# Patient Record
Sex: MALE | Race: WHITE | NOT HISPANIC OR LATINO
[De-identification: names, ages, dates, MRNs, and addresses within clinical notes are randomized per-mention and may not be internally consistent; named-entity substitution may affect disease eponyms.]

---

## 2022-08-04 ENCOUNTER — APPOINTMENT (OUTPATIENT)
Dept: ORTHOPEDIC SURGERY | Facility: CLINIC | Age: 52
End: 2022-08-04

## 2022-08-04 VITALS — BODY MASS INDEX: 29.82 KG/M2 | HEIGHT: 67 IN | WEIGHT: 190 LBS

## 2022-08-04 DIAGNOSIS — M25.519 PAIN IN UNSPECIFIED SHOULDER: ICD-10-CM

## 2022-08-04 DIAGNOSIS — Z78.9 OTHER SPECIFIED HEALTH STATUS: ICD-10-CM

## 2022-08-04 PROBLEM — Z00.00 ENCOUNTER FOR PREVENTIVE HEALTH EXAMINATION: Status: ACTIVE | Noted: 2022-08-04

## 2022-08-04 PROCEDURE — J3490M: CUSTOM

## 2022-08-04 PROCEDURE — 73010 X-RAY EXAM OF SHOULDER BLADE: CPT | Mod: LT

## 2022-08-04 PROCEDURE — 99214 OFFICE O/P EST MOD 30 MIN: CPT | Mod: 25

## 2022-08-04 PROCEDURE — 99072 ADDL SUPL MATRL&STAF TM PHE: CPT

## 2022-08-04 PROCEDURE — 20611 DRAIN/INJ JOINT/BURSA W/US: CPT | Mod: LT

## 2022-08-04 PROCEDURE — 73030 X-RAY EXAM OF SHOULDER: CPT | Mod: LT

## 2022-08-04 NOTE — ASSESSMENT
[FreeTextEntry1] : WORK INJURY WITH ENSUING MARKED PAIN AND WEAKNESS\par SMALL CALCIUM DEPOSIT ON XRAYS, LIKELY EXACERBATION WITH POSSIBLE CONCOMITANT CUFF INJURY\par CSI FOR RELIEF\par MRI TO EVAL TEAR\par NO WORK

## 2022-08-04 NOTE — HISTORY OF PRESENT ILLNESS
[9] : 9 [6] : 6 [Sharp] : sharp [Shooting] : shooting [FreeTextEntry1] : left shoulder [FreeTextEntry5] : pt is here for left shoulder injury WC doi 8/3/22. works for [employer], was working on a pole pulling a service wire laterally and felt a a pull/ "lightening bolt" go through his shoulder. pain doesn’t radiate down the arm. pain moving the arm, lifting arm, putting shirt on. 750 ibuprofen helped slightly. pt is working full time currently

## 2022-08-04 NOTE — PROCEDURE
[FreeTextEntry3] : - Large joint injection was performed of the LEFT subacromial space. \par - The indication for this procedure was pain and inflammation. Patient has tried OTC's including aspirin, Ibuprofen, Aleve, etc or prescription NSAIDS, and/or exercises at home and/or physical therapy without satisfactory response, patient had decreased mobility in the joint and the risks benefits, and alternatives have been discussed, and verbal consent was obtained. The site was prepped with alcohol, betadine, ethyl chloride sprayed topically and sterile technique used. \par - An injection of Betamethasone 2cc; Lidocaine 3cc; Marcaine 3cc injected.\par - Patient was advised to call if redness, pain or fever occur, apply ice for 15 minutes out of every hour for the next 12-24 hours as tolerated and patient was advised to rest the joint(s) for 2 days. \par - Ultrasound guidance was indicated for this patient due to prior failure or difficult injection. All ultrasound images have been permanently captured and stored accordingly in our picture archiving and communication system. Visualization of the needle and placement of injection was performed without complication.\par

## 2022-08-04 NOTE — WORK
[Total] : total [Does not reveal pre-existing condition(s) that may affect treatment/prognosis] : does not reveal pre-existing condition(s) that may affect treatment/prognosis [Cannot return to work because ________] : cannot return to work because [unfilled] [No Rx restrictions] : No Rx restrictions. [I provided the services listed above] :  I provided the services listed above.

## 2022-08-04 NOTE — IMAGING
[de-identified] : No swelling, no ecchymosis, no shilpa deformity\par Tenderness to palpation over greater tuberosity and anterior shoulder\par No instability or tenderness over AC joint\par Gurading during exam\par Marked pain with strength testing\par Positive Garcia test, positive impingement sign\par Motor and sensory intact distally\par  [Left] : left shoulder [Calcific density] : Calcific density

## 2022-08-12 ENCOUNTER — FORM ENCOUNTER (OUTPATIENT)
Age: 52
End: 2022-08-12

## 2022-08-13 ENCOUNTER — APPOINTMENT (OUTPATIENT)
Dept: MRI IMAGING | Facility: CLINIC | Age: 52
End: 2022-08-13

## 2022-08-13 PROCEDURE — 99072 ADDL SUPL MATRL&STAF TM PHE: CPT

## 2022-08-13 PROCEDURE — 73221 MRI JOINT UPR EXTREM W/O DYE: CPT | Mod: LT

## 2022-08-22 ENCOUNTER — APPOINTMENT (OUTPATIENT)
Dept: ORTHOPEDIC SURGERY | Facility: CLINIC | Age: 52
End: 2022-08-22

## 2022-08-22 PROCEDURE — 99072 ADDL SUPL MATRL&STAF TM PHE: CPT

## 2022-08-22 PROCEDURE — 99214 OFFICE O/P EST MOD 30 MIN: CPT | Mod: PA

## 2022-08-22 RX ORDER — METHYLPREDNISOLONE 4 MG/1
4 TABLET ORAL
Qty: 1 | Refills: 0 | Status: ACTIVE | COMMUNITY
Start: 2022-08-22 | End: 1900-01-01

## 2022-08-22 NOTE — WORK
[Total] : total [Does not reveal pre-existing condition(s) that may affect treatment/prognosis] : does not reveal pre-existing condition(s) that may affect treatment/prognosis [Cannot return to work because ________] : cannot return to work because [unfilled] [No Rx restrictions] : No Rx restrictions. [I provided the services listed above] :  I provided the services listed above. [Less than Sedentary Work:] :  Less than Sedentary Work: Unable to meet the requirement of Sedentary Work.

## 2022-08-22 NOTE — ASSESSMENT
[FreeTextEntry1] : WORK INJURY WITH ENSUING MARKED PAIN AND WEAKNESS\par SMALL CALCIUM DEPOSIT ON XRAYS, LIKELY EXACERBATION WITH POSSIBLE CONCOMITANT CUFF INJURY\par \par - reviewed his mri. discussed the importance of range of motion will get him into therapy and hep program. He will remain out of work at this time due to the nature of his position. will start him on medrol pack and f/u range of motion again in 2 weeks. possible return to work after that visit \par

## 2022-08-22 NOTE — HISTORY OF PRESENT ILLNESS
[9] : 9 [6] : 6 [Sharp] : sharp [Shooting] : shooting [Not working due to injury] : Work status: not working due to injury [de-identified] : 8-22-22- He states 20-4-% improvement in regards to his pain since the csi left shoulder at last visit. still with limited range of motion and weakness. He remains out of work had his mri and is for its review [FreeTextEntry1] : left shoulder [FreeTextEntry5] : pt is here for left shoulder injury WC doi 8/3/22. works for [employer], was working on a pole pulling a service wire laterally and felt a a pull/ "lightening bolt" go through his shoulder. pain doesn’t radiate down the arm. pain moving the arm, lifting arm, putting shirt on. 750 ibuprofen helped slightly. pt is working full time currently [de-identified] : mri done at [facility]  [de-identified] : nothing

## 2022-08-22 NOTE — PHYSICAL EXAM
[Left] : left shoulder [4 ___] : forward flexion 4[unfilled]/5 [4___] : internal rotation 4[unfilled]/5 [] : negative drop-arm test [FreeTextEntry9] : limited ir due to pain [TWNoteComboBox7] : active forward flexion 80 degrees [TWNoteComboBox4] : passive forward flexion 115 degrees

## 2022-08-22 NOTE — DATA REVIEWED
[MRI] : MRI [Shoulder] : shoulder [Report was reviewed and noted in the chart] : The report was reviewed and noted in the chart [I reviewed the films/CD and agree] : I reviewed the films/CD and agree [FreeTextEntry1] : 1. Multiple nonspecific findings include severe capsulitis in the glenohumeral joint along with superior labral \par tearing, rotator cuff tendinopathy, biceps tenosynovitis, AC joint arthrosis, subacromial bursitis and possible \par recent therapeutic injection.\par 2. Nonspecific areas of subcortical reactive marrow signal change in the humeral head and neck along with red \par marrow hyperplasia or muscle atrophy. The possibility of a recent anterior dislocation or subluxation episode \par cannot be excluded. Clinical correlation regarding possible reflex sympathetic or inflammatory arthropathy is \par recommended. The possibility of chronic glenohumeral joint instability should be considered.\par 3. Clinical correlation and follow up is recommended.

## 2022-08-31 ENCOUNTER — APPOINTMENT (OUTPATIENT)
Dept: ORTHOPEDIC SURGERY | Facility: CLINIC | Age: 52
End: 2022-08-31

## 2022-08-31 VITALS — BODY MASS INDEX: 29.82 KG/M2 | WEIGHT: 190 LBS | HEIGHT: 67 IN

## 2022-08-31 DIAGNOSIS — S43.422A SPRAIN OF LEFT ROTATOR CUFF CAPSULE, INITIAL ENCOUNTER: ICD-10-CM

## 2022-08-31 PROCEDURE — 99214 OFFICE O/P EST MOD 30 MIN: CPT

## 2022-08-31 PROCEDURE — 99072 ADDL SUPL MATRL&STAF TM PHE: CPT

## 2022-08-31 NOTE — IMAGING
[de-identified] : No swelling, no ecchymosis, no shilpa deformity\par Tenderness to palpation over greater tuberosity\par No instability or tenderness over AC joint\par 170/70/50/40\par +APPREHENSION\par Speed’s and yergason negative\par Motor and sensory intact distally\par

## 2022-08-31 NOTE — ASSESSMENT
[FreeTextEntry1] : REVIEWED MRI, ACUTE ANTERIOR DISLOCATION WITH ASSOCIATED BURSITIS\par FEELS BETTER AFTER MDP\par OK TO RTW NEXT WEEK\par START PT

## 2022-10-10 ENCOUNTER — APPOINTMENT (OUTPATIENT)
Dept: ORTHOPEDIC SURGERY | Facility: CLINIC | Age: 52
End: 2022-10-10

## 2022-10-10 DIAGNOSIS — M25.312 OTHER INSTABILITY, LEFT SHOULDER: ICD-10-CM

## 2022-10-10 PROCEDURE — 99214 OFFICE O/P EST MOD 30 MIN: CPT

## 2022-10-10 PROCEDURE — 99072 ADDL SUPL MATRL&STAF TM PHE: CPT

## 2022-10-10 NOTE — IMAGING
[de-identified] : No swelling, no ecchymosis, no shilpa deformity\par Tenderness to palpation over greater tuberosity\par No instability or tenderness over AC joint\par 150/70/40/30\par 5-/5 supraspinatus, infraspinatus and subscapularis; there is pain with strength testing\par Positive Garcia test, positive impingement sign\par Speed’s and yergason negative\par Motor and sensory intact distally\par

## 2022-10-10 NOTE — HISTORY OF PRESENT ILLNESS
[3] : 3 [0] : 0 [Dull/Aching] : dull/aching [Intermittent] : intermittent [Physical therapy] : physical therapy [Full time] : Work status: full time [de-identified] : 10/10/22 follow up workers comp left shoulder cont physical therapy with good relief rom is better  [FreeTextEntry1] : lt shoulder [de-identified] : with certain movements  [de-identified] : ice

## 2022-12-12 ENCOUNTER — APPOINTMENT (OUTPATIENT)
Dept: ORTHOPEDIC SURGERY | Facility: CLINIC | Age: 52
End: 2022-12-12

## 2022-12-12 PROCEDURE — 99072 ADDL SUPL MATRL&STAF TM PHE: CPT

## 2022-12-12 PROCEDURE — 99214 OFFICE O/P EST MOD 30 MIN: CPT

## 2022-12-12 NOTE — HISTORY OF PRESENT ILLNESS
[3] : 3 [0] : 0 [Dull/Aching] : dull/aching [Intermittent] : intermittent [Physical therapy] : physical therapy [] : yes [Full time] : Work status: full time [de-identified] : 10/10/22 follow up workers comp left shoulder cont physical therapy with good relief rom is better \par \par 12/12/22 follow up workers comp left shoulder cont physical therapy with good relief rom is better  [FreeTextEntry1] : lt shoulder [de-identified] : with certain movements  [de-identified] : ice\par PT

## 2022-12-12 NOTE — ASSESSMENT
[FreeTextEntry1] : HAS PROGRESSED TO HEP. CONTINUE\par WORKING FULL DUTY\par FOLLOW UP PRN - APPROACHING MMI

## 2022-12-12 NOTE — IMAGING
[de-identified] : No swelling, no ecchymosis, no shilpa deformity\par Tenderness to palpation over greater tuberosity\par No instability or tenderness over AC joint\par 160/70/50/40\par +APPREHENSION\par Speed’s and yergason negative\par Motor and sensory intact distally\par

## 2023-01-05 ENCOUNTER — EMERGENCY (EMERGENCY)
Facility: HOSPITAL | Age: 53
LOS: 1 days | Discharge: HOME | End: 2023-01-05
Attending: EMERGENCY MEDICINE
Payer: COMMERCIAL

## 2023-01-05 VITALS
DIASTOLIC BLOOD PRESSURE: 85 MMHG | RESPIRATION RATE: 15 BRPM | TEMPERATURE: 98 F | OXYGEN SATURATION: 99 % | WEIGHT: 195.11 LBS | SYSTOLIC BLOOD PRESSURE: 142 MMHG | HEIGHT: 67 IN | HEART RATE: 97 BPM

## 2023-01-05 LAB
BASOPHILS # BLD AUTO: 0.03 K/UL — SIGNIFICANT CHANGE UP (ref 0–0.2)
BASOPHILS NFR BLD AUTO: 0.4 % — SIGNIFICANT CHANGE UP (ref 0–2)
EOSINOPHIL # BLD AUTO: 0.12 K/UL — SIGNIFICANT CHANGE UP (ref 0–0.5)
EOSINOPHIL NFR BLD AUTO: 1.6 % — SIGNIFICANT CHANGE UP (ref 0–6)
HCT VFR BLD CALC: 43.7 % — SIGNIFICANT CHANGE UP (ref 39–50)
HGB BLD-MCNC: 15 G/DL — SIGNIFICANT CHANGE UP (ref 13–17)
IMM GRANULOCYTES NFR BLD AUTO: 0.5 % — SIGNIFICANT CHANGE UP (ref 0–0.9)
LYMPHOCYTES # BLD AUTO: 1.27 K/UL — SIGNIFICANT CHANGE UP (ref 1–3.3)
LYMPHOCYTES # BLD AUTO: 17 % — SIGNIFICANT CHANGE UP (ref 13–44)
MCHC RBC-ENTMCNC: 31.6 PG — SIGNIFICANT CHANGE UP (ref 27–34)
MCHC RBC-ENTMCNC: 34.3 GM/DL — SIGNIFICANT CHANGE UP (ref 32–36)
MCV RBC AUTO: 92.2 FL — SIGNIFICANT CHANGE UP (ref 80–100)
MONOCYTES # BLD AUTO: 0.53 K/UL — SIGNIFICANT CHANGE UP (ref 0–0.9)
MONOCYTES NFR BLD AUTO: 7.1 % — SIGNIFICANT CHANGE UP (ref 2–14)
NEUTROPHILS # BLD AUTO: 5.5 K/UL — SIGNIFICANT CHANGE UP (ref 1.8–7.4)
NEUTROPHILS NFR BLD AUTO: 73.4 % — SIGNIFICANT CHANGE UP (ref 43–77)
PLATELET # BLD AUTO: 170 K/UL — SIGNIFICANT CHANGE UP (ref 150–400)
RBC # BLD: 4.74 M/UL — SIGNIFICANT CHANGE UP (ref 4.2–5.8)
RBC # FLD: 13.4 % — SIGNIFICANT CHANGE UP (ref 10.3–14.5)
WBC # BLD: 7.49 K/UL — SIGNIFICANT CHANGE UP (ref 3.8–10.5)
WBC # FLD AUTO: 7.49 K/UL — SIGNIFICANT CHANGE UP (ref 3.8–10.5)

## 2023-01-05 PROCEDURE — 71045 X-RAY EXAM CHEST 1 VIEW: CPT | Mod: 26

## 2023-01-05 PROCEDURE — 99284 EMERGENCY DEPT VISIT MOD MDM: CPT

## 2023-01-05 PROCEDURE — 93010 ELECTROCARDIOGRAM REPORT: CPT

## 2023-01-05 NOTE — ED PROVIDER NOTE - CLINICAL SUMMARY MEDICAL DECISION MAKING FREE TEXT BOX
51 y/o M presents w/ chest pain for the past few hours. states he was driving today when eh developed a burning sensation over the middle of his chest.   dr. glasgow cardiology, had echo / stress aprox 2 weeks ago with normal results per patient. trop x2, cardiac monitor. cxr, labs.

## 2023-01-05 NOTE — ED PROVIDER NOTE - PATIENT PORTAL LINK FT
You can access the FollowMyHealth Patient Portal offered by [facility] by registering at the following website: [email or web address]. By joining [employer]’s FollowMyHealth portal, you will also be able to view your health information using other applications (apps) compatible with our system.

## 2023-01-05 NOTE — ED ADULT TRIAGE NOTE - CHIEF COMPLAINT QUOTE
Pt came to ED c.o non-radiating intermittent epigastric pain with shortness of breath x 45 min. Denies N/V.

## 2023-01-05 NOTE — ED PROVIDER NOTE - ATTENDING CONTRIBUTION TO CARE
Atypical chest discomfort with nonischemic ECG, recent stress testing normal. Troponin trended x 2 to r/o AMI. Symptoms resolved. Follow up with cardiology outpatient

## 2023-01-05 NOTE — ED PROVIDER NOTE - NSFOLLOWUPINSTRUCTIONS_ED_ALL_ED_FT
Followup with your cardiologist in the next 7 days.     Chest Pain    Chest pain can be caused by many different conditions which may or may not be dangerous. Causes include heartburn, lung infections, heart attack, blood clot in lungs, skin infections, strain or damage to muscle, cartilage, or bones, etc. In addition to a history and physical examination, an electrocardiogram (ECG) or other lab tests may have been performed to determine the cause of your chest pain. Follow up with your primary care provider or with a cardiologist as instructed.     SEEK IMMEDIATE MEDICAL CARE IF YOU HAVE ANY OF THE FOLLOWING SYMPTOMS: worsening chest pain, coughing up blood, unexplained back/neck/jaw pain, severe abdominal pain, dizziness or lightheadedness, fainting, shortness of breath, sweaty or clammy skin, vomiting, or racing heart beat. These symptoms may represent a serious problem that is an emergency. Do not wait to see if the symptoms will go away. Get medical help right away. Call 911 and do not drive yourself to the hospital.

## 2023-01-05 NOTE — ED PROVIDER NOTE - OBJECTIVE STATEMENT
51 y/o M presents w/ chest pain for the past few hours. states he was driving today when eh developed a burning sensation over the middle of his chest. intermittent every 5 minutes. follows w/ cardiologist dr. glasgow, normal echo and stress 2 weeks ago aprox per patient. states his liver enzymes were also elevated after started taking crestor, stopped taking it 1 day ago. denies ilicit drug use. denies fever/chills. denies worsening pain w/ exertion. non-pleuritic, not worse after meals.

## 2023-01-05 NOTE — ED PROVIDER NOTE - CARE PROVIDER_API CALL
Michelle Jamison)  Cardiology; Internal Medicine  [address] [place] [address], [address] [id number]  Phone: [phone]  Fax: [phone]  Follow Up Time: 7-10 Days

## 2023-01-05 NOTE — ED PROVIDER NOTE - PHYSICAL EXAMINATION
General: Well appearing male in no acute distress  HEENT: Normocephalic, atraumatic. Moist mucous membranes. Oropharynx clear. No lymphadenopathy.  Eyes: No scleral icterus. EOMI. MAGALIE.  Neck:. Soft and supple. Full ROM without pain. No midline tenderness  Cardiac: Regular rate and regular rhythm. No murmurs, rubs, gallops. Peripheral pulses 2+ and symmetric. No LE edema.  Resp: Lungs CTAB. Speaking in full sentences. No wheezes, rales or rhonchi.  Abd: Soft, non-tender, non-distended. No guarding or rebound. No scars, masses, or lesions.  Back: Spine midline and non-tender. No CVA tenderness.    Skin: No rashes, abrasions, or lacerations.  Neuro: AO x 3. Moves all extremities symmetrically. Motor strength and sensation grossly intact.

## 2023-01-06 VITALS
RESPIRATION RATE: 18 BRPM | OXYGEN SATURATION: 98 % | SYSTOLIC BLOOD PRESSURE: 118 MMHG | DIASTOLIC BLOOD PRESSURE: 73 MMHG | HEART RATE: 71 BPM

## 2023-01-06 LAB
ALBUMIN SERPL ELPH-MCNC: 4.1 G/DL — SIGNIFICANT CHANGE UP (ref 3.3–5.2)
ALP SERPL-CCNC: 60 U/L — SIGNIFICANT CHANGE UP (ref 40–120)
ALT FLD-CCNC: 46 U/L — HIGH
ANION GAP SERPL CALC-SCNC: 11 MMOL/L — SIGNIFICANT CHANGE UP (ref 5–17)
AST SERPL-CCNC: 33 U/L — SIGNIFICANT CHANGE UP
BILIRUB SERPL-MCNC: 0.6 MG/DL — SIGNIFICANT CHANGE UP (ref 0.4–2)
BUN SERPL-MCNC: 16.2 MG/DL — SIGNIFICANT CHANGE UP (ref 8–20)
CALCIUM SERPL-MCNC: 9.1 MG/DL — SIGNIFICANT CHANGE UP (ref 8.4–10.5)
CHLORIDE SERPL-SCNC: 105 MMOL/L — SIGNIFICANT CHANGE UP (ref 96–108)
CO2 SERPL-SCNC: 25 MMOL/L — SIGNIFICANT CHANGE UP (ref 22–29)
CREAT SERPL-MCNC: 0.92 MG/DL — SIGNIFICANT CHANGE UP (ref 0.5–1.3)
EGFR: 101 ML/MIN/1.73M2 — SIGNIFICANT CHANGE UP
GLUCOSE SERPL-MCNC: 103 MG/DL — HIGH (ref 70–99)
LIDOCAIN IGE QN: 32 U/L — SIGNIFICANT CHANGE UP (ref 22–51)
POTASSIUM SERPL-MCNC: 4.2 MMOL/L — SIGNIFICANT CHANGE UP (ref 3.5–5.3)
POTASSIUM SERPL-SCNC: 4.2 MMOL/L — SIGNIFICANT CHANGE UP (ref 3.5–5.3)
PROT SERPL-MCNC: 6.9 G/DL — SIGNIFICANT CHANGE UP (ref 6.6–8.7)
SODIUM SERPL-SCNC: 141 MMOL/L — SIGNIFICANT CHANGE UP (ref 135–145)
TROPONIN T SERPL-MCNC: <0.01 NG/ML — SIGNIFICANT CHANGE UP (ref 0–0.06)
TROPONIN T SERPL-MCNC: <0.01 NG/ML — SIGNIFICANT CHANGE UP (ref 0–0.06)

## 2023-01-06 PROCEDURE — 71045 X-RAY EXAM CHEST 1 VIEW: CPT

## 2023-01-06 PROCEDURE — 83690 ASSAY OF LIPASE: CPT

## 2023-01-06 PROCEDURE — 80053 COMPREHEN METABOLIC PANEL: CPT

## 2023-01-06 PROCEDURE — 93005 ELECTROCARDIOGRAM TRACING: CPT

## 2023-01-06 PROCEDURE — 85025 COMPLETE CBC W/AUTO DIFF WBC: CPT

## 2023-01-06 PROCEDURE — 99285 EMERGENCY DEPT VISIT HI MDM: CPT | Mod: 25

## 2023-01-06 PROCEDURE — 36415 COLL VENOUS BLD VENIPUNCTURE: CPT

## 2023-01-06 PROCEDURE — 84484 ASSAY OF TROPONIN QUANT: CPT

## 2023-01-06 NOTE — ED ADULT NURSE NOTE - NS ED NURSE IV DC DT
Detail Level: Simple Photo Preface (Leave Blank If You Do Not Want): Photographs were obtained today 06-Jan-2023 03:56

## 2023-01-06 NOTE — ED ADULT NURSE NOTE - OBJECTIVE STATEMENT
Aox4. Pt presenting to Emergency Department complaining of chest pain. Pt states " I was driving and began to heavy feeling in my chest that lasted about 5 mins". Denies SOB, headaches, dizziness, n/v/d, edema. Reports recent visit to cardiologist in which they told him liver enzymes were elevated. Pt on cardiac monitor in NSR and  at 97% RA. Respirations even and unlabored. Skin warm to touch.

## 2023-01-17 NOTE — H&P PST ADULT - NSICDXPASTMEDICALHX_GEN_ALL_CORE_FT
PAST MEDICAL HISTORY:  Chest pain     Elevated coronary artery calcium score     Hyperlipidemia

## 2023-01-17 NOTE — H&P PST ADULT - HISTORY OF PRESENT ILLNESS
Department of Cardiology                                                                  [facility]/[place] [address] [address]                                                            Telephone: [phone]. Fax:[phone]                                                                             Pre- Cardiac Procedure H + P      HPI:  52 year old male with h/o HLD, taken of statin d/t elevated liver enzymes, who c/o chest pain.  He has a negative stress test but elevated calcium score of 538.  He was recently in [facility] ER for c/o chest pain was screened with a normal cardiogram and normal enzymes and discharged to home.  He cont to c/o chest discomfort.  For [facility] to assess coronary arteries.       Symptoms:        Angina (Class):        Ischemic Symptoms: chest discomfort    Heart Failure:        Systolic/Diastolic/Combined:        NYHA Class (within 2 weeks):     Assessment of LVEF:       EF:        Assessed by:        Date:     Prior Cardiac Interventions: NA         Noninvasive Testing:   Stress Test: Date: 12/20/22    Exercise stress test negative    11/1/22 CTA  calcium qmwdy=962  LM=0  DKT=811  LCx=30  DZV=570    Echo: no report available      Risk Assessments:  ASA:  Mallampati:  Bleeding Risk:  Creatinine:  GFR:    Associated Risk Factors:        Frailty Screening: (N/A, mild, mod, severe)       Cerebrovascular Disease: N/A       Chronic Lung Disease: N/A       Peripheral Arterial Disease: N/A       Chronic Kidney Disease (if yes, what is GFR): N/A       Uncontrolled Diabetes (if yes, what is HgbA1C or FBS): N/A       Poorly Controlled Hypertension (if yes, what is SBP): N/A       Morbid Obesity (if yes, what is BMI): N/A       History of Recent Ventricular Arrhythmia: N/A       Inability to Ambulate Safely: N/A       Need for Therapeutic Anticoagulation: N/A       Antiplatelet or Contrast Allergy: N/A    Antianginal Therapies:        Beta Blockers:         Calcium Channel Blockers:        Long Acting Nitrates:        Ranexa:     	  MEDICATIONS:                    ROS:     PHYSICAL EXAM:      T(C): --  HR: --  BP: --  RR: --  SpO2: --  Wt(kg): --      I&O's Summary      Daily     Daily     TELEMETRY: 	      ECG:  	    LABS:	 	                        Tnl:    Lipid Profile:   TC  TG  LDL  HDL    HgA1c:     proBNP:     TSH:     Impression/Plan:      ****-IVH with NS 250mL bolus pre-cath                                                                             Department of Cardiology                                                                  [facility]/[place] [address] [address]                                                            Telephone: [phone]. Fax:[phone]                                                                             Pre- Cardiac Procedure H + P      HPI:  52 year old male with h/o HLD, taken of statin d/t elevated liver enzymes, who c/o chest pain.  He has a negative stress test but elevated calcium score of 538.  He was recently in [facility] ER for c/o chest pain was screened with a normal cardiogram and normal enzymes and discharged to home.  He cont to c/o chest discomfort.  For [facility] to assess coronary arteries.       Symptoms:        Angina (Class):        Ischemic Symptoms: chest discomfort    Heart Failure:        Systolic/Diastolic/Combined:        NYHA Class (within 2 weeks):     Assessment of LVEF:       EF:        Assessed by:        Date:     Prior Cardiac Interventions: NA         Noninvasive Testing:   Stress Test: Date: 12/20/22    Exercise stress test negative    11/1/22 CTA  calcium mytav=922  LM=0  AVI=636  LCx=30  ZRX=707    Echo: no report available        Associated Risk Factors:        Frailty Screening: (N/A, mild, mod, severe)       Cerebrovascular Disease: N/A       Chronic Lung Disease: N/A       Peripheral Arterial Disease: N/A       Chronic Kidney Disease (if yes, what is GFR): N/A       Uncontrolled Diabetes (if yes, what is HgbA1C or FBS): N/A       Poorly Controlled Hypertension (if yes, what is SBP): N/A       Morbid Obesity (if yes, what is BMI): N/A       History of Recent Ventricular Arrhythmia: N/A       Inability to Ambulate Safely: N/A       Need for Therapeutic Anticoagulation: N/A       Antiplatelet or Contrast Allergy: N/A    Antianginal Therapies:        Beta Blockers:         Calcium Channel Blockers:        Long Acting Nitrates:        Ranexa:     	  MEDICATIONS:                    ROS:     PHYSICAL EXAM:      T(C): --  HR: --  BP: --  RR: --  SpO2: --  Wt(kg): --      I&O's Summary      Daily     Daily     TELEMETRY: 	      ECG:  	    LABS:	 	                        Tnl:    Lipid Profile:   TC  TG  LDL  HDL    HgA1c:     proBNP:     TSH:                                                                                  Department of Cardiology                                                                  [facility]/[place] [address] [address]                                                            Telephone: [phone]. Fax:[phone]                                                                             Pre- Cardiac Procedure H + P      HPI:  52 year old male with h/o HLD, taken of statin d/t elevated liver enzymes, who c/o chest pain.  He has a negative stress test but elevated calcium score of 538.  He was recently in [facility] ER for c/o chest pain was screened with a normal cardiogram and normal enzymes and discharged to home.  He cont to c/o chest discomfort.  For [facility] to assess coronary arteries.       Symptoms:        Angina (Class):        Ischemic Symptoms: chest discomfort    Heart Failure:        Systolic/Diastolic/Combined:        NYHA Class (within 2 weeks):     Assessment of LVEF:       EF:        Assessed by:        Date:     Prior Cardiac Interventions: NA         Noninvasive Testing:   Stress Test: Date: 12/20/22    Exercise stress test negative    11/1/22 CTA  calcium bdtyl=634  LM=0  PWN=500  LCx=30  HRN=223    Echo: no report available        Associated Risk Factors:        Frailty Screening: (N/A, mild, mod, severe)       Cerebrovascular Disease: N/A       Chronic Lung Disease: N/A       Peripheral Arterial Disease: N/A       Chronic Kidney Disease (if yes, what is GFR): N/A       Uncontrolled Diabetes (if yes, what is HgbA1C or FBS): N/A       Poorly Controlled Hypertension (if yes, what is SBP): N/A       Morbid Obesity (if yes, what is BMI): N/A       History of Recent Ventricular Arrhythmia: N/A       Inability to Ambulate Safely: N/A       Need for Therapeutic Anticoagulation: N/A       Antiplatelet or Contrast Allergy: N/A    Antianginal Therapies:        Beta Blockers:         Calcium Channel Blockers:        Long Acting Nitrates:        Ranexa:     	  MEDICATIONS:                  ROS: as stated above, otherwise negative    PHYSICAL EXAM:  Constitutional: A & O x 3, NAD  HEENT:  Normal oral mucosa, PERRL, EOMI	  Cardiovascular: S1 S2, No murmurs, No JVD  Respiratory: Lungs clear to auscultation	  Gastrointestinal:  Soft, Non-tender, + BS	  Skin: No rashes or cyanosis  Neurologic: No deficit appreciated  Extremities: Normal range of motion, no edema  Vascular: distal pulses +         T(C): --  HR: --  BP: --  RR: --  SpO2: --  Wt(kg): --      I&O's Summary      Daily     Daily     TELEMETRY: SR	      ECG:  SR	    LABS:	 	                        Tnl:    Lipid Profile:   TC  TG  LDL  HDL    HgA1c:     proBNP:     TSH:                                                                                  Department of Cardiology                                                                  [facility]/[place] [address] [address]                                                            Telephone: [phone]. Fax:[phone]                                                                             Pre- Cardiac Procedure H + P      HPI:  52 year old male with h/o live liver donor, HLD, taken of statin d/t elevated liver enzymes, who c/o chest pain.  He has a negative stress test but elevated calcium score of 538.  He was recently in [facility] ER for c/o chest pain was screened with a normal cardiogram and normal enzymes and discharged to home.  He cont to c/o chest discomfort.  For [facility] to assess coronary arteries.       Symptoms:        Angina (Class):        Ischemic Symptoms: chest discomfort    Heart Failure:        Systolic/Diastolic/Combined:        NYHA Class (within 2 weeks):     Assessment of LVEF:       EF:        Assessed by:        Date:     Prior Cardiac Interventions: NA         Noninvasive Testing:   Stress Test: Date: 12/20/22    Exercise stress test negative    11/1/22 CTA  calcium dkehb=378  LM=0  YSK=881  LCx=30  OOI=930    Echo: no report available        Associated Risk Factors:        Frailty Screening: (N/A, mild, mod, severe)       Cerebrovascular Disease: N/A       Chronic Lung Disease: N/A       Peripheral Arterial Disease: N/A       Chronic Kidney Disease (if yes, what is GFR): N/A       Uncontrolled Diabetes (if yes, what is HgbA1C or FBS): N/A       Poorly Controlled Hypertension (if yes, what is SBP): N/A       Morbid Obesity (if yes, what is BMI): N/A       History of Recent Ventricular Arrhythmia: N/A       Inability to Ambulate Safely: N/A       Need for Therapeutic Anticoagulation: N/A       Antiplatelet or Contrast Allergy: N/A    Antianginal Therapies:        Beta Blockers:         Calcium Channel Blockers:        Long Acting Nitrates:        Ranexa:     	  MEDICATIONS:                  ROS: as stated above, otherwise negative    PHYSICAL EXAM:  Constitutional: A & O x 3, NAD  HEENT:  Normal oral mucosa, PERRL, EOMI	  Cardiovascular: S1 S2, No murmurs, No JVD  Respiratory: Lungs clear to auscultation	  Gastrointestinal:  Soft, Non-tender, + BS	  Skin: No rashes or cyanosis  Neurologic: No deficit appreciated  Extremities: Normal range of motion, no edema  Vascular: distal pulses +         T(C): --  HR: --  BP: --  RR: --  SpO2: --  Wt(kg): --      I&O's Summary      Daily     Daily     TELEMETRY: SR	      ECG:  SR	    LABS:	 	                        Tnl:    Lipid Profile:   TC  TG  LDL  HDL    HgA1c:     proBNP:     TSH:

## 2023-01-17 NOTE — H&P PST ADULT - ASSESSMENT
52 year old with c/o chest pain and elevated calcium score for LHC to assess coronary arteries. Impression:  52 year old with c/o chest pain and elevated calcium score for LHC to assess coronary arteries.    Risk Assessments:  ASA:  Mallampati:  Bleeding Risk:  Creatinine:  GFR:        Plan:  -plan for LHC via RA vs FA  -patient seen and examined  -confirmed appropriate NPO duration  -BRANDT prophylaxis 250cc NS bolus ordered  -ECG and Labs reviewed  -Aspirin 81mg po pre-cath  -procedure discussed with patient; risks and benefits explained, questions answered  -consent obtained by attending IC   Impression:  52 year old with c/o chest pain and elevated calcium score for LHC to assess coronary arteries.    Risk Assessments:  ASA: 2  Mallampati: 2  Bleeding Risk:  Creatinine:  GFR:        Plan:  -plan for LHC via RA vs FA  -patient seen and examined  -confirmed appropriate NPO duration  -BRANDT prophylaxis 250cc NS bolus ordered  -ECG and Labs reviewed  -Aspirin 81mg po pre-cath  -procedure discussed with patient; risks and benefits explained, questions answered  -consent obtained by attending IC   Impression:  52 year old with c/o chest pain and elevated calcium score for LHC to assess coronary arteries.    Risk Assessments:  ASA: 2  Mallampati: 2  Bleeding Risk: 0.9%  Creatinine: 0.93  GFR: 100        Plan:  -plan for LHC via RA   -patient seen and examined  -confirmed appropriate NPO duration  -BRANDT prophylaxis 250cc NS bolus ordered  -ECG and Labs reviewed  -Aspirin 81mg po pre-cath  -procedure discussed with patient; risks and benefits explained, questions answered  -consent obtained by attending IC

## 2023-01-18 ENCOUNTER — TRANSCRIPTION ENCOUNTER (OUTPATIENT)
Age: 53
End: 2023-01-18

## 2023-01-18 ENCOUNTER — OUTPATIENT (OUTPATIENT)
Dept: OUTPATIENT SERVICES | Facility: HOSPITAL | Age: 53
LOS: 1 days | Discharge: HOME | End: 2023-01-18
Payer: COMMERCIAL

## 2023-01-18 VITALS
SYSTOLIC BLOOD PRESSURE: 112 MMHG | DIASTOLIC BLOOD PRESSURE: 68 MMHG | HEART RATE: 73 BPM | OXYGEN SATURATION: 97 % | RESPIRATION RATE: 20 BRPM

## 2023-01-18 VITALS
RESPIRATION RATE: 20 BRPM | OXYGEN SATURATION: 100 % | SYSTOLIC BLOOD PRESSURE: 108 MMHG | DIASTOLIC BLOOD PRESSURE: 67 MMHG | HEART RATE: 55 BPM | TEMPERATURE: 98 F

## 2023-01-18 DIAGNOSIS — I25.10 ATHEROSCLEROTIC HEART DISEASE OF NATIVE CORONARY ARTERY WITHOUT ANGINA PECTORIS: ICD-10-CM

## 2023-01-18 DIAGNOSIS — Z98.890 OTHER SPECIFIED POSTPROCEDURAL STATES: Chronic | ICD-10-CM

## 2023-01-18 DIAGNOSIS — Z94.4 LIVER TRANSPLANT STATUS: Chronic | ICD-10-CM

## 2023-01-18 LAB
ANION GAP SERPL CALC-SCNC: 8 MMOL/L — SIGNIFICANT CHANGE UP (ref 5–17)
BUN SERPL-MCNC: 13.1 MG/DL — SIGNIFICANT CHANGE UP (ref 8–20)
CALCIUM SERPL-MCNC: 9 MG/DL — SIGNIFICANT CHANGE UP (ref 8.4–10.5)
CHLORIDE SERPL-SCNC: 105 MMOL/L — SIGNIFICANT CHANGE UP (ref 96–108)
CO2 SERPL-SCNC: 27 MMOL/L — SIGNIFICANT CHANGE UP (ref 22–29)
CREAT SERPL-MCNC: 0.93 MG/DL — SIGNIFICANT CHANGE UP (ref 0.5–1.3)
EGFR: 100 ML/MIN/1.73M2 — SIGNIFICANT CHANGE UP
GLUCOSE SERPL-MCNC: 105 MG/DL — HIGH (ref 70–99)
HCT VFR BLD CALC: 45.1 % — SIGNIFICANT CHANGE UP (ref 39–50)
HGB BLD-MCNC: 15.4 G/DL — SIGNIFICANT CHANGE UP (ref 13–17)
MAGNESIUM SERPL-MCNC: 2 MG/DL — SIGNIFICANT CHANGE UP (ref 1.8–2.6)
MCHC RBC-ENTMCNC: 31.9 PG — SIGNIFICANT CHANGE UP (ref 27–34)
MCHC RBC-ENTMCNC: 34.1 GM/DL — SIGNIFICANT CHANGE UP (ref 32–36)
MCV RBC AUTO: 93.4 FL — SIGNIFICANT CHANGE UP (ref 80–100)
PLATELET # BLD AUTO: 165 K/UL — SIGNIFICANT CHANGE UP (ref 150–400)
POTASSIUM SERPL-MCNC: 4.3 MMOL/L — SIGNIFICANT CHANGE UP (ref 3.5–5.3)
POTASSIUM SERPL-SCNC: 4.3 MMOL/L — SIGNIFICANT CHANGE UP (ref 3.5–5.3)
RBC # BLD: 4.83 M/UL — SIGNIFICANT CHANGE UP (ref 4.2–5.8)
RBC # FLD: 12.6 % — SIGNIFICANT CHANGE UP (ref 10.3–14.5)
SODIUM SERPL-SCNC: 140 MMOL/L — SIGNIFICANT CHANGE UP (ref 135–145)
WBC # BLD: 4.83 K/UL — SIGNIFICANT CHANGE UP (ref 3.8–10.5)
WBC # FLD AUTO: 4.83 K/UL — SIGNIFICANT CHANGE UP (ref 3.8–10.5)

## 2023-01-18 PROCEDURE — 93005 ELECTROCARDIOGRAM TRACING: CPT

## 2023-01-18 PROCEDURE — 80048 BASIC METABOLIC PNL TOTAL CA: CPT

## 2023-01-18 PROCEDURE — C1887: CPT

## 2023-01-18 PROCEDURE — 85027 COMPLETE CBC AUTOMATED: CPT

## 2023-01-18 PROCEDURE — C1769: CPT

## 2023-01-18 PROCEDURE — 83735 ASSAY OF MAGNESIUM: CPT

## 2023-01-18 PROCEDURE — 36415 COLL VENOUS BLD VENIPUNCTURE: CPT

## 2023-01-18 PROCEDURE — C1894: CPT

## 2023-01-18 PROCEDURE — 93458 L HRT ARTERY/VENTRICLE ANGIO: CPT

## 2023-01-18 PROCEDURE — 93010 ELECTROCARDIOGRAM REPORT: CPT

## 2023-01-18 RX ORDER — ASPIRIN/CALCIUM CARB/MAGNESIUM 324 MG
1 TABLET ORAL
Qty: 30 | Refills: 0
Start: 2023-01-18 | End: 2023-02-16

## 2023-01-18 RX ORDER — ASPIRIN/CALCIUM CARB/MAGNESIUM 324 MG
1 TABLET ORAL
Qty: 0 | Refills: 0 | DISCHARGE

## 2023-01-18 RX ORDER — SODIUM CHLORIDE 9 MG/ML
250 INJECTION INTRAMUSCULAR; INTRAVENOUS; SUBCUTANEOUS ONCE
Refills: 0 | Status: DISCONTINUED | OUTPATIENT
Start: 2023-01-18 | End: 2023-02-02

## 2023-01-18 RX ORDER — ASPIRIN/CALCIUM CARB/MAGNESIUM 324 MG
81 TABLET ORAL DAILY
Refills: 0 | Status: DISCONTINUED | OUTPATIENT
Start: 2023-01-18 | End: 2023-02-02

## 2023-01-18 NOTE — ASU PATIENT PROFILE, ADULT - AS SC BRADEN MOISTURE
MEDICATIONS  (STANDING):  ARIPiprazole Injectable, Long Acting. (ABILIFY MAINTENA) 400 milliGRAM(s) IntraMuscular once  buPROPion  milliGRAM(s) Oral daily  pantoprazole    Tablet 40 milliGRAM(s) Oral before breakfast  QUEtiapine 100 milliGRAM(s) Oral at bedtime    MEDICATIONS  (PRN):  ALBUTerol    90 MICROgram(s) HFA Inhaler 2 Puff(s) Inhalation every 6 hours PRN Shortness of Breath and/or Wheezing  hydrOXYzine hydrochloride 50 milliGRAM(s) Oral every 6 hours PRN anxiety  LORazepam     Tablet 1 milliGRAM(s) Oral every 6 hours PRN severe anxiety  melatonin. 3 milliGRAM(s) Oral at bedtime PRN Insomnia  OLANZapine 5 milliGRAM(s) Oral every 6 hours PRN agitation  OLANZapine Injectable 5 milliGRAM(s) IntraMuscular every 6 hours PRN severe agitation  traZODone 50 milliGRAM(s) Oral at bedtime PRN insomnia   MEDICATIONS  (STANDING):  pantoprazole    Tablet 40 milliGRAM(s) Oral before breakfast  QUEtiapine 50 milliGRAM(s) Oral at bedtime    MEDICATIONS  (PRN):  ALBUTerol    90 MICROgram(s) HFA Inhaler 2 Puff(s) Inhalation every 6 hours PRN Shortness of Breath and/or Wheezing  hydrOXYzine hydrochloride 50 milliGRAM(s) Oral every 6 hours PRN anxiety  LORazepam     Tablet 1 milliGRAM(s) Oral every 6 hours PRN severe anxiety  melatonin. 3 milliGRAM(s) Oral at bedtime PRN Insomnia  OLANZapine 5 milliGRAM(s) Oral every 6 hours PRN agitation  OLANZapine Injectable 5 milliGRAM(s) IntraMuscular every 6 hours PRN severe agitation  traZODone 50 milliGRAM(s) Oral at bedtime PRN insomnia   MEDICATIONS  (STANDING):  pantoprazole    Tablet 40 milliGRAM(s) Oral before breakfast  QUEtiapine 50 milliGRAM(s) Oral at bedtime    MEDICATIONS  (PRN):  ALBUTerol    90 MICROgram(s) HFA Inhaler 2 Puff(s) Inhalation every 6 hours PRN Shortness of Breath and/or Wheezing  hydrOXYzine hydrochloride 50 milliGRAM(s) Oral every 6 hours PRN anxiety  LORazepam     Tablet 1 milliGRAM(s) Oral every 4 hours PRN severe anxiety  melatonin. 3 milliGRAM(s) Oral at bedtime PRN Insomnia  OLANZapine 5 milliGRAM(s) Oral every 6 hours PRN agitation  OLANZapine Injectable 5 milliGRAM(s) IntraMuscular every 6 hours PRN severe agitation  traZODone 50 milliGRAM(s) Oral at bedtime PRN insomnia   MEDICATIONS  (STANDING):  pantoprazole    Tablet 40 milliGRAM(s) Oral before breakfast  QUEtiapine 50 milliGRAM(s) Oral at bedtime    MEDICATIONS  (PRN):  ALBUTerol    90 MICROgram(s) HFA Inhaler 2 Puff(s) Inhalation every 6 hours PRN Shortness of Breath and/or Wheezing  hydrOXYzine hydrochloride 50 milliGRAM(s) Oral every 6 hours PRN anxiety  LORazepam     Tablet 1 milliGRAM(s) Oral every 6 hours PRN severe anxiety  melatonin. 3 milliGRAM(s) Oral at bedtime PRN Insomnia  OLANZapine 5 milliGRAM(s) Oral every 6 hours PRN agitation  OLANZapine Injectable 5 milliGRAM(s) IntraMuscular every 6 hours PRN severe agitation  traZODone 50 milliGRAM(s) Oral at bedtime PRN insomnia   (4) rarely moist MEDICATIONS  (STANDING):  buPROPion  milliGRAM(s) Oral daily  pantoprazole    Tablet 40 milliGRAM(s) Oral before breakfast  QUEtiapine 100 milliGRAM(s) Oral at bedtime    MEDICATIONS  (PRN):  ALBUTerol    90 MICROgram(s) HFA Inhaler 2 Puff(s) Inhalation every 6 hours PRN Shortness of Breath and/or Wheezing  hydrOXYzine hydrochloride 50 milliGRAM(s) Oral every 6 hours PRN anxiety  LORazepam     Tablet 1 milliGRAM(s) Oral every 6 hours PRN severe anxiety  melatonin. 3 milliGRAM(s) Oral at bedtime PRN Insomnia  OLANZapine 5 milliGRAM(s) Oral every 6 hours PRN agitation  OLANZapine Injectable 5 milliGRAM(s) IntraMuscular every 6 hours PRN severe agitation  traZODone 50 milliGRAM(s) Oral at bedtime PRN insomnia

## 2023-01-18 NOTE — DISCHARGE NOTE PROVIDER - NSDCCPCAREPLAN_GEN_ALL_CORE_FT
PRINCIPAL DISCHARGE DIAGNOSIS  Diagnosis: Chest pain  Assessment and Plan of Treatment: -You had a cardiac catheterization today with Dr. Wakefield   -Please continue to take your aspirin 81mg daily  -Follow up with Dr. Samuel in 2 weeks post discharge  -Please return to the nearest ED with any chest pain; chest pressure; shortness of breath; palpitations; dizziness; leg edema

## 2023-01-18 NOTE — PROGRESS NOTE ADULT - ASSESSMENT
Now s/p LHC via RRA with Dr. Wakefield; tolerated procedure well. Pt arrived to recovery in NAD and HDS, RRA access site stable with radial band in place, no bleed/hematoma, distal pulse +, RUE/right hand remains acyanotic/ warm to touch; palpable distal pulses  Intraprocedurally: Lidocaine 1% 10ml; Midazolam 1mg; Fentanyl 50mcg; Verapamil 5mg; Heparin 5000 units; NS bolus 100ml; Omnipaque 87ml   Findings: Mild nonobstructive CAD (PRELIMINARY REPORT; OFFICIAL REPORT PENDING)    Plan:  -Formal cath report pending  -Post procedure management/monitoring per protocol  -Access site precautions  -Radial compression band removal at 2100  -Bedrest x 2 hours post procedure  -Labs and EKG in am  -NS 0.9% 250ml/hr x 1 bolus: post procedure BRANDT ppx   -Repeat ECG if any clinical indication or change on tele  -Continue current medical therapy  -Continue mono antiplatelet therapy with aspirin 81mg daily  -No statin due to previously elevated LFTs/ liver donor  -Educated regarding post procedure management and care  -Discussed the importance of RF modification  -Cardiac rehab info provided/referral and communication to cardiac rehab completed  -F/U outpt in 1-2 weeks with Cardiologist Dr. Samuel  -Lipid Rx with PSCK9 to be determined as outpatient with patient's cardiologist.   -DISPO:  Plan for D/C this evening if remains HDS, ECG and labs in am stable and without complications

## 2023-01-18 NOTE — DISCHARGE NOTE NURSING/CASE MANAGEMENT/SOCIAL WORK - PATIENT PORTAL LINK FT
You can access the FollowMyHealth Patient Portal offered by [facility] by registering at the following website: [email or web address]. By joining [facility]’s FollowMyHealth portal, you will also be able to view your health information using other applications (apps) compatible with our system.

## 2023-01-18 NOTE — DISCHARGE NOTE PROVIDER - HOSPITAL COURSE
HPI:  52 year old male with h/o live liver donor, HLD, taken of statin d/t elevated liver enzymes, who c/o chest pain.  He has a negative stress test but elevated calcium score of 538.  He was recently in [facility] ER for c/o chest pain was screened with a normal cardiogram and normal enzymes and discharged to home.  He cont to c/o chest discomfort.  For C to assess coronary arteries.       Symptoms:        Angina (Class): I       Ischemic Symptoms: chest discomfort    Heart Failure:        Systolic/Diastolic/Combined:        NYHA Class (within 2 weeks):       Prior Cardiac Interventions: NA         Noninvasive Testing:   Stress Test: Date: 12/20/22    Exercise stress test negative    11/1/22 CTA  calcium ckxis=304  LM=0  XEM=950  LCx=30  QDW=173    Echo: no report available    Now s/p LHC via RRA with Dr. Wakefield; tolerated procedure well. Pt arrived to recovery in NAD and HDS, RRA access site stable with radial band in place, no bleed/hematoma, distal pulse +, RUE/right hand remains acyanotic/ warm to touch; palpable distal pulses  Intraprocedurally: Lidocaine 1% 10ml; Midazolam 1mg; Fentanyl 50mcg; Verapamil 5mg; Heparin 5000 units; NS bolus 100ml; Omnipaque 87ml   Findings: Mild nonobstructive CAD (PRELIMINARY REPORT; OFFICIAL REPORT PENDING)    Plan:  -Formal cath report pending  -Post procedure management/monitoring per protocol  -Access site precautions  -Radial compression band removal at 2100  -Bedrest x 2 hours post procedure  -Labs and EKG in am  -NS 0.9% 250ml/hr x 1 bolus: post procedure BRANDT ppx   -Repeat ECG if any clinical indication or change on tele  -Continue current medical therapy  -Continue mono antiplatelet therapy with aspirin 81mg daily  -No statin due to previously elevated LFTs/ liver donor  -Educated regarding post procedure management and care  -Discussed the importance of RF modification  -Cardiac rehab info provided/referral and communication to cardiac rehab completed  -F/U outpt in 1-2 weeks with Cardiologist Dr. Samuel  -Lipid Rx with PSCK9 to be determined as outpatient with patient's cardiologist.   -DISPO:  Plan for D/C this evening if remains HDS, ECG and labs in am stable and without complications

## 2023-01-18 NOTE — DISCHARGE NOTE PROVIDER - CARE PROVIDER_API CALL
Davide Samuel (MD)  Cardiovascular Disease; Critical Care Medicine; Internal Medicine  [address] [place] [address]  Phone: [phone]  Fax: [phone]  Established Patient  Follow Up Time: 2 weeks

## 2023-01-18 NOTE — PROGRESS NOTE ADULT - SUBJECTIVE AND OBJECTIVE BOX
INTERVENTIONAL CARDIOLOGY NP NOTE                                                                                             History obtained by: Patient and medical record  Community Cardiologist: Dr. Samuel  Reason for Consultation: Evaluation for cardiac catheterization  Available pt records reviewed: Yes [ x ] No [  ]    Chief complaint:    Patient is a 52y old  Male who presents with a chief complaint of Cardiac Cath (17 Jan 2023 14:27)      HPI:                                                                          Department of Cardiology                                                                  [place] [address] [address]                                                            Telephone: [phone]. Fax:[phone]                                                                             Pre- Cardiac Procedure H + P      HPI:  52 year old male with h/o live liver donor, HLD, taken of statin d/t elevated liver enzymes, who c/o chest pain.  He has a negative stress test but elevated calcium score of 538.  He was recently in [facility] ER for c/o chest pain was screened with a normal cardiogram and normal enzymes and discharged to home.  He cont to c/o chest discomfort.  For [place] to assess coronary arteries.       Symptoms:        Angina (Class): I       Ischemic Symptoms: chest discomfort    Heart Failure:        Systolic/Diastolic/Combined:        NYHA Class (within 2 weeks):       Prior Cardiac Interventions: NA         Noninvasive Testing:   Stress Test: Date: 12/20/22    Exercise stress test negative    11/1/22 CTA  calcium legsj=306  LM=0  FZR=536  LCx=30  AUA=230    Echo: no report available      Associated Risk Factors:        Frailty Screening: (N/A, mild, mod, severe) none       Cerebrovascular Disease: N/A       Chronic Lung Disease: N/A       Peripheral Arterial Disease: N/A       Chronic Kidney Disease (if yes, what is GFR): N/A       Uncontrolled Diabetes (if yes, what is HgbA1C or FBS): N/A       Poorly Controlled Hypertension (if yes, what is SBP): N/A       Morbid Obesity (if yes, what is BMI): N/A       History of Recent Ventricular Arrhythmia: N/A       Inability to Ambulate Safely: N/A       Need for Therapeutic Anticoagulation: N/A       Antiplatelet or Contrast Allergy: N/A    ROS: as stated above, otherwise negative    PHYSICAL EXAM:  Constitutional: A & O x 3, NAD  HEENT:  Normal oral mucosa, PERRL, EOMI	  Cardiovascular: S1 S2, No murmurs, No JVD  Respiratory: Lungs clear to auscultation	  Gastrointestinal:  Soft, Non-tender, + BS	  Skin: No rashes or cyanosis  Neurologic: No deficit appreciated  Extremities: Normal range of motion, no edema  Vascular: distal pulses +      (17 Jan 2023 14:27)        PAST MEDICAL HISTORY  Hyperlipidemia    Chest pain    Elevated coronary artery calcium score      PAST SURGICAL HISTORY  Liver transplanted    History of hernia repair    FAMILY HISTORY:    Family History of Premature Cardiovascular Disease:  Yes [  ] No [  ]    HOME MEDICATIONS:  aspirin 81 mg oral delayed release tablet: 1 tab(s) orally once a day (18 Jan 2023 14:33)    ALLERGIES:   penicillin (Hives)      REVIEW OF SYMPTOMS:   CONSTITUTIONAL: no fever, no chills, no weight loss, no weight gain, no fatigue   CARDIOVASCULAR: no chest pain; no chest pressure; no shortness of breath; no palpitations; no dizziness  RESPIRATORY: no Shortness of breath, no cough, no wheezing  : No dysuria, no hematuria   GI: No dark color stool, no nausea, no diarrhea, no constipation, no abdominal pain   NEURO: No headache, no slurred speech   ALL OTHER REVIEW OF SYSTEMS ARE NEGATIVE.    VITAL SIGNS:  T(C): 36.8 (01-18-23 @ 14:47), Max: 36.8 (01-18-23 @ 14:47)  T(F): 98.2 (01-18-23 @ 14:47), Max: 98.2 (01-18-23 @ 14:47)  HR: 55 (01-18-23 @ 14:47) (55 - 55)  BP: 108/67 (01-18-23 @ 14:47) (108/67 - 108/67)  RR: 20 (01-18-23 @ 14:47) (20 - 20)  SpO2: 100% (01-18-23 @ 14:47) (100% - 100%)    INTAKE AND OUTPUT:       PHYSICAL EXAM:  Constitutional: Comfortable . No acute distress.   HEENT: Atraumatic and normocephalic , neck is supple . no JVD. No carotid bruit.  CNS: A&Ox3. No focal deficits.   Respiratory: CTAB, unlabored   Cardiovascular: RRR normal s1 s2. No murmur. No rubs or gallop.  Gastrointestinal: Soft, non-tender. +Bowel sounds.   Extremities: 2+ Peripheral Pulses, No clubbing, cyanosis, or edema  Psychiatric: Calm . no agitation.   Skin: Warm and dry, no ulcers on extremities     LABS:  ( 06 Jan 2023 02:40 )  Troponin T  <0.01,  CPK  X    , CKMB  X    , BNP X        , ( 05 Jan 2023 23:30 )  Troponin T  <0.01,  CPK  X    , CKMB  X    , BNP X                                  15.4   4.83  )-----------( 165      ( 18 Jan 2023 14:38 )             45.1     01-18    140  |  105  |  13.1  ----------------------------<  105<H>  4.3   |  27.0  |  0.93    Ca    9.0      18 Jan 2023 14:38  Mg     2.0     01-18

## 2023-01-18 NOTE — DISCHARGE NOTE PROVIDER - NSDCCPTREATMENT_GEN_ALL_CORE_FT
PRINCIPAL PROCEDURE  Procedure: Left heart cardiac catheterization  Findings and Treatment: -You had a cardiac catheterization with Dr. Wakefield today on 1/18/23. Dr Wakefield accessed your right radial artery during the procedure. That is the artery in your right wrist.   -Please continue to monitor your right wrist when you go home. If you develop any bleeding, lay down where you are and apply direct firm pressure until the bleeding stops. If the bleeding is excessive, please call 911.   -If heavy bleeding or large lumps form, hold pressure at the spot and come to the Emergency Room.  -No submerging the arm in water for 48 hours. This includes hot tubs, swimming pools and jacuzzis.  You may start showering tomorrow on 1/19. Prior to showering, remove dressing from right wrist. Shower with warm water and soap. Pat dry with towel. You may place a bandaid over the site. change the bandaid every day.   -Restricted use with no heavy lifting of affected arm for 48 hours. No heavy lifting greater than 10lbs.  -You may walk indoors/ outdoors as tolerated. No strenuous exercise, gym, sports or heavy lifting x5 days.  -Please return to nearest ED if you develop any fever, chills, drainage from the incision site, or any change of temperature, color, sensation of the affected extremity.  -Call your doctor for any bleeding, swelling, loss of sensation in the hand or fingers, or fingers turning blue.  -Please return to nearest ED if you develop any chest pain, chest pressure, shortness of breath, jaw pain, pain radiating down the arm, palpitations, dizziness, palpitations, abdominal complaints including abdominal pain, nausea and vomiting.   -Please follow up with your cardiologist in 1-2 weeks      
Attending Attestation (For Attendings USE Only)...

## 2023-01-23 DIAGNOSIS — I25.110 ATHEROSCLEROTIC HEART DISEASE OF NATIVE CORONARY ARTERY WITH UNSTABLE ANGINA PECTORIS: ICD-10-CM

## 2023-03-15 PROBLEM — R93.1 ABNORMAL FINDINGS ON DIAGNOSTIC IMAGING OF HEART AND CORONARY CIRCULATION: Chronic | Status: ACTIVE | Noted: 2023-01-17

## 2023-03-15 PROBLEM — E78.5 HYPERLIPIDEMIA, UNSPECIFIED: Chronic | Status: ACTIVE | Noted: 2023-01-17

## 2023-03-15 PROBLEM — R07.9 CHEST PAIN, UNSPECIFIED: Chronic | Status: ACTIVE | Noted: 2023-01-17

## 2023-03-23 ENCOUNTER — APPOINTMENT (OUTPATIENT)
Dept: ORTHOPEDIC SURGERY | Facility: CLINIC | Age: 53
End: 2023-03-23
Payer: OTHER MISCELLANEOUS

## 2023-03-23 VITALS — BODY MASS INDEX: 29.82 KG/M2 | HEIGHT: 67 IN | WEIGHT: 190 LBS

## 2023-03-23 DIAGNOSIS — S43.015D ANTERIOR DISLOCATION OF LEFT HUMERUS, SUBSEQUENT ENCOUNTER: ICD-10-CM

## 2023-03-23 DIAGNOSIS — M75.32 CALCIFIC TENDINITIS OF LEFT SHOULDER: ICD-10-CM

## 2023-03-23 DIAGNOSIS — Z78.9 OTHER SPECIFIED HEALTH STATUS: ICD-10-CM

## 2023-03-23 DIAGNOSIS — S43.432D SUPERIOR GLENOID LABRUM LESION OF LEFT SHOULDER, SUBSEQUENT ENCOUNTER: ICD-10-CM

## 2023-03-23 PROCEDURE — 99455 WORK RELATED DISABILITY EXAM: CPT

## 2023-03-23 NOTE — IMAGING
[de-identified] : Left shoulder No swelling, no ecchymosis, no deformity, no scapular winging.\par Anterior tenderness to palpation , no AC joint or trapezius ttp.\par Active Forward flexion to 130; passive \par Active Abduction 130; Passive abduction 160\par External rotation to 80 degrees (with shoulder abducted); internal rotation to 60 degrees (with shoulder abducted)\par Extension 20; adduction 30\par 5-/5 supraspinatus, infraspinatus and subscapularis\par +Garcia test,+impingement sign, + apprehension, negative O’Bala test.\par Speed’s and yergason negative\par Motor and sensory intact distally\par \par Right shoulder No swelling, no ecchymosis, no deformity, no scapular winging.\par No tenderness to palpation over shoulder, AC joint or trapezius.\par Active Forward flexion 180; passive Forward flexion to 180; \par Active Abduction 180; Passive abduction 180\par External rotation to 90 degrees (with shoulder abducted); internal rotation to 70 degrees (with shoulder abducted)\par Extension 60; adduction 30\par 5/5 supraspinatus, infraspinatus and subscapularis\par Negative Garcia test, negative impingement sign, negative O’Bala test.\par Speed’s and yergason negative\par Motor and sensory intact distally\par \par

## 2023-03-23 NOTE — WORK
[Has the patient reached Maximum Medical Improvement? If yes, indicate date___] : Yes, on [unfilled] [Is there permanent partial impairment?] : Yes [FreeTextEntry6] : Left shoulder 27.5% (forward flexion deficit 20% and extension deficit 7.5%)\par Measurements performed with patient sitting. Average of 3 measurements using goniometer.\par  [Has the patient had an injury/illness since the date of injury which impacts residual functional capacity?] : No [Would the patient benefit from vocational rehabilitation? If Yes, explain below.] :  No

## 2023-03-23 NOTE — HISTORY OF PRESENT ILLNESS
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 pt states condition has improved since last visit. [facility]  [de-identified] : with certain movements  [de-identified] : ice\par PT [de-identified] : [employer]

## 2025-04-25 ENCOUNTER — NON-APPOINTMENT (OUTPATIENT)
Age: 55
End: 2025-04-25

## 2025-04-25 ENCOUNTER — APPOINTMENT (OUTPATIENT)
Dept: ORTHOPEDIC SURGERY | Facility: CLINIC | Age: 55
End: 2025-04-25
Payer: OTHER MISCELLANEOUS

## 2025-04-25 VITALS — HEIGHT: 67 IN | WEIGHT: 190 LBS | BODY MASS INDEX: 29.82 KG/M2

## 2025-04-25 DIAGNOSIS — M25.559 PAIN IN UNSPECIFIED HIP: ICD-10-CM

## 2025-04-25 DIAGNOSIS — S76.012A STRAIN OF MUSCLE, FASCIA AND TENDON OF LEFT HIP, INITIAL ENCOUNTER: ICD-10-CM

## 2025-04-25 DIAGNOSIS — M25.569 PAIN IN UNSPECIFIED KNEE: ICD-10-CM

## 2025-04-25 DIAGNOSIS — M23.92 UNSPECIFIED INTERNAL DERANGEMENT OF LEFT KNEE: ICD-10-CM

## 2025-04-25 PROCEDURE — 73564 X-RAY EXAM KNEE 4 OR MORE: CPT | Mod: LT

## 2025-04-25 PROCEDURE — 72170 X-RAY EXAM OF PELVIS: CPT

## 2025-04-25 PROCEDURE — L1833: CPT | Mod: LT

## 2025-04-25 PROCEDURE — 99214 OFFICE O/P EST MOD 30 MIN: CPT

## 2025-04-25 PROCEDURE — 72100 X-RAY EXAM L-S SPINE 2/3 VWS: CPT

## 2025-04-25 RX ORDER — MELOXICAM 15 MG/1
15 TABLET ORAL DAILY
Qty: 30 | Refills: 1 | Status: ACTIVE | COMMUNITY
Start: 2025-04-25 | End: 2025-06-24

## 2025-04-28 ENCOUNTER — APPOINTMENT (OUTPATIENT)
Dept: MRI IMAGING | Facility: CLINIC | Age: 55
End: 2025-04-28

## 2025-04-28 PROCEDURE — 73721 MRI JNT OF LWR EXTRE W/O DYE: CPT | Mod: 59,LT

## 2025-04-28 PROCEDURE — 73721 MRI JNT OF LWR EXTRE W/O DYE: CPT | Mod: LT

## 2025-05-09 ENCOUNTER — APPOINTMENT (OUTPATIENT)
Dept: ORTHOPEDIC SURGERY | Facility: CLINIC | Age: 55
End: 2025-05-09
Payer: OTHER MISCELLANEOUS

## 2025-05-09 VITALS — BODY MASS INDEX: 29.82 KG/M2 | WEIGHT: 190 LBS | HEIGHT: 67 IN

## 2025-05-09 DIAGNOSIS — M70.62 TROCHANTERIC BURSITIS, LEFT HIP: ICD-10-CM

## 2025-05-09 DIAGNOSIS — S83.232D COMPLEX TEAR OF MEDIAL MENISCUS, CURRENT INJURY, LEFT KNEE, SUBSEQUENT ENCOUNTER: ICD-10-CM

## 2025-05-09 PROCEDURE — 99214 OFFICE O/P EST MOD 30 MIN: CPT

## 2025-05-22 ENCOUNTER — APPOINTMENT (OUTPATIENT)
Dept: ORTHOPEDIC SURGERY | Facility: CLINIC | Age: 55
End: 2025-05-22
Payer: OTHER MISCELLANEOUS

## 2025-05-22 DIAGNOSIS — S83.232D COMPLEX TEAR OF MEDIAL MENISCUS, CURRENT INJURY, LEFT KNEE, SUBSEQUENT ENCOUNTER: ICD-10-CM

## 2025-05-22 DIAGNOSIS — S76.012A STRAIN OF MUSCLE, FASCIA AND TENDON OF LEFT HIP, INITIAL ENCOUNTER: ICD-10-CM

## 2025-05-22 PROCEDURE — 99214 OFFICE O/P EST MOD 30 MIN: CPT

## 2025-06-26 ENCOUNTER — APPOINTMENT (OUTPATIENT)
Dept: ORTHOPEDIC SURGERY | Facility: CLINIC | Age: 55
End: 2025-06-26
Payer: OTHER MISCELLANEOUS

## 2025-06-26 VITALS — HEIGHT: 67 IN | BODY MASS INDEX: 29.82 KG/M2 | WEIGHT: 190 LBS

## 2025-06-26 PROCEDURE — 99213 OFFICE O/P EST LOW 20 MIN: CPT
